# Patient Record
Sex: FEMALE | Race: WHITE | NOT HISPANIC OR LATINO | URBAN - METROPOLITAN AREA
[De-identification: names, ages, dates, MRNs, and addresses within clinical notes are randomized per-mention and may not be internally consistent; named-entity substitution may affect disease eponyms.]

---

## 2017-05-24 ENCOUNTER — OFFICE (OUTPATIENT)
Dept: URBAN - METROPOLITAN AREA CLINIC 101 | Facility: CLINIC | Age: 67
End: 2017-05-24
Payer: COMMERCIAL

## 2017-05-24 VITALS
HEIGHT: 64 IN | HEART RATE: 79 BPM | SYSTOLIC BLOOD PRESSURE: 110 MMHG | TEMPERATURE: 97.7 F | DIASTOLIC BLOOD PRESSURE: 76 MMHG | WEIGHT: 221 LBS

## 2017-05-24 DIAGNOSIS — R10.11 RIGHT UPPER QUADRANT PAIN: ICD-10-CM

## 2017-05-24 DIAGNOSIS — K92.1 MELENA: ICD-10-CM

## 2017-05-24 DIAGNOSIS — K21.9 GASTRO-ESOPHAGEAL REFLUX DISEASE WITHOUT ESOPHAGITIS: ICD-10-CM

## 2017-05-24 DIAGNOSIS — M32.9 SYSTEMIC LUPUS ERYTHEMATOSUS, UNSPECIFIED: ICD-10-CM

## 2017-05-24 DIAGNOSIS — R74.8 ABNORMAL LEVELS OF OTHER SERUM ENZYMES: ICD-10-CM

## 2017-05-24 PROCEDURE — 99204 OFFICE O/P NEW MOD 45 MIN: CPT

## 2017-05-24 NOTE — SERVICEHPINOTES
Sharla Perez is a 67 YO female with prior cholecystectomy is  seen at request of Dr. Cedric Espinosa for new abdominal back pain, abdominal pain, and melena. She started having pain in right back that later radiated around flank to right upper quadrant and central abdomen. She has h/o renal cysts and no UTI symptoms. Pain was for two weeks, 7 of 10. Pain was better with taking NSAIDS, and nothing made it worse. She took 6 Advil a day. She had been taking  Prilosec for GERD and did not have heartburn. Her PCP ordered CBC which was normal ( no anemia) but lipase was elevated 751 normal LFT's. Her last EGD in 2011 revealed chronic duodenitis and gastritis, no celiac. She denies nausea, vomiting, weight loss. She had iron deficient anemia in the past. She is abnormally tired and has SLE.She also reported possible melena and two days of loose BSS type 6 black stool this past weekend. No abdominal pain or rectal bleeding. She has not had a bowel movement since the loose stool three days ago. Her last colonoscopy was 2011 at which time no polyps were seen, but ileocecal valve was friable, no colitis or ileitis seen on pathology, no polyps.

## 2017-06-05 ENCOUNTER — ON CAMPUS - OUTPATIENT (OUTPATIENT)
Dept: URBAN - METROPOLITAN AREA HOSPITAL 35 | Facility: HOSPITAL | Age: 67
End: 2017-06-05
Payer: COMMERCIAL

## 2017-06-05 DIAGNOSIS — R10.11 RIGHT UPPER QUADRANT PAIN: ICD-10-CM

## 2017-06-05 DIAGNOSIS — K21.9 GASTRO-ESOPHAGEAL REFLUX DISEASE WITHOUT ESOPHAGITIS: ICD-10-CM

## 2017-06-05 PROCEDURE — 43239 EGD BIOPSY SINGLE/MULTIPLE: CPT
